# Patient Record
Sex: MALE | Race: WHITE | URBAN - METROPOLITAN AREA
[De-identification: names, ages, dates, MRNs, and addresses within clinical notes are randomized per-mention and may not be internally consistent; named-entity substitution may affect disease eponyms.]

---
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
## 2025-03-06 ENCOUNTER — HOSPITAL ENCOUNTER (EMERGENCY)
Facility: HOSPITAL | Age: 20
Discharge: HOME | End: 2025-03-06
Attending: STUDENT IN AN ORGANIZED HEALTH CARE EDUCATION/TRAINING PROGRAM
Payer: COMMERCIAL

## 2025-03-06 VITALS
BODY MASS INDEX: 25.71 KG/M2 | OXYGEN SATURATION: 96 % | SYSTOLIC BLOOD PRESSURE: 139 MMHG | DIASTOLIC BLOOD PRESSURE: 80 MMHG | HEART RATE: 74 BPM | WEIGHT: 160 LBS | TEMPERATURE: 98 F | HEIGHT: 66 IN | RESPIRATION RATE: 20 BRPM

## 2025-03-06 DIAGNOSIS — K21.9 GERD (GASTROESOPHAGEAL REFLUX DISEASE): ICD-10-CM

## 2025-03-06 PROCEDURE — 99284 EMERGENCY DEPT VISIT MOD MDM: CPT | Mod: 25

## 2025-03-06 PROCEDURE — 93005 ELECTROCARDIOGRAM TRACING: CPT

## 2025-03-06 PROCEDURE — 25000003 PHARM REV CODE 250: Performed by: STUDENT IN AN ORGANIZED HEALTH CARE EDUCATION/TRAINING PROGRAM

## 2025-03-06 PROCEDURE — 93010 ELECTROCARDIOGRAM REPORT: CPT | Mod: ,,, | Performed by: INTERNAL MEDICINE

## 2025-03-06 RX ORDER — ALUMINUM HYDROXIDE, MAGNESIUM HYDROXIDE, AND SIMETHICONE 1200; 120; 1200 MG/30ML; MG/30ML; MG/30ML
30 SUSPENSION ORAL ONCE
Status: COMPLETED | OUTPATIENT
Start: 2025-03-06 | End: 2025-03-06

## 2025-03-06 RX ORDER — OMEPRAZOLE 20 MG/1
20 CAPSULE, DELAYED RELEASE ORAL DAILY
Qty: 30 CAPSULE | Refills: 1 | Status: SHIPPED | OUTPATIENT
Start: 2025-03-06 | End: 2026-03-06

## 2025-03-06 RX ORDER — FAMOTIDINE 20 MG/1
20 TABLET, FILM COATED ORAL
Status: COMPLETED | OUTPATIENT
Start: 2025-03-06 | End: 2025-03-06

## 2025-03-06 RX ORDER — LIDOCAINE HYDROCHLORIDE 20 MG/ML
15 SOLUTION OROPHARYNGEAL ONCE
Status: COMPLETED | OUTPATIENT
Start: 2025-03-06 | End: 2025-03-06

## 2025-03-06 RX ADMIN — ALUMINUM HYDROXIDE, MAGNESIUM HYDROXIDE, AND SIMETHICONE 30 ML: 1200; 120; 1200 SUSPENSION ORAL at 07:03

## 2025-03-06 RX ADMIN — LIDOCAINE HYDROCHLORIDE 15 ML: 20 SOLUTION ORAL at 07:03

## 2025-03-06 RX ADMIN — FAMOTIDINE 20 MG: 20 TABLET, FILM COATED ORAL at 07:03

## 2025-03-06 NOTE — ED PROVIDER NOTES
Encounter Date: 3/6/2025       History     Chief Complaint   Patient presents with    epigastric pain for 2 days     States has been dry heaving with this pain for 2 days     HPI  Patient is a 19-year-old male no significant past medical history who presents to the ER complaining of epigastric pain radiating to his chest ongoing for the past 2 days.  Patient notes that pain is worse at night.  He denies any shortness of breath.  Patient denies any back pain.  Patient states pain feels like a burning in sensation.  Review of patient's allergies indicates:  No Known Allergies  History reviewed. No pertinent past medical history.  History reviewed. No pertinent surgical history.  No family history on file.  Social History[1]  Review of Systems   Constitutional:  Negative for fever.   HENT:  Negative for sore throat.    Eyes:  Negative for visual disturbance.   Respiratory:  Negative for shortness of breath.    Cardiovascular:  Positive for chest pain.   Gastrointestinal:  Positive for abdominal pain. Negative for nausea.        Epigastric pain   Endocrine: Negative for polyuria.   Genitourinary:  Negative for dysuria.   Musculoskeletal:  Negative for back pain.   Skin:  Negative for rash.   Neurological:  Negative for weakness.   Hematological:  Does not bruise/bleed easily.   All other systems reviewed and are negative.      Physical Exam     Initial Vitals [03/06/25 0716]   BP Pulse Resp Temp SpO2   139/80 74 20 97.9 °F (36.6 °C) 96 %      MAP       --         Physical Exam    Nursing note and vitals reviewed.  Constitutional: Vital signs are normal. He appears well-developed and well-nourished. He is not diaphoretic. He is active.  Non-toxic appearance. He does not appear ill. No distress.   HENT:   Head: Normocephalic and atraumatic.   Eyes: Conjunctivae are normal. Pupils are equal, round, and reactive to light. Right conjunctiva is not injected. Left conjunctiva is not injected.   Neck: Trachea normal. Neck  supple.   Normal range of motion.   Full passive range of motion without pain.     Cardiovascular:  Normal rate, regular rhythm, S1 normal, S2 normal, intact distal pulses and normal pulses.           Pulmonary/Chest: Breath sounds normal. No respiratory distress. He has no wheezes.   Abdominal: Abdomen is soft. Bowel sounds are normal. There is abdominal tenderness.   Reproducible epigastric pain on palpation.  No rebound, no guarding, normoactive bowel sounds.  No palpable masses.   Musculoskeletal:         General: No tenderness or edema. Normal range of motion.      Cervical back: Full passive range of motion without pain, normal range of motion and neck supple. No rigidity.      Right lower leg: No swelling. No edema.      Left lower leg: No swelling. No edema.     Neurological: He is alert and oriented to person, place, and time.   Skin: Skin is warm and dry. Capillary refill takes less than 2 seconds.         ED Course   Procedures  Labs Reviewed - No data to display  EKG Readings: (Independently Interpreted)   Initial Reading: No STEMI. Rhythm: Sinus Bradycardia. Heart Rate: 47.   Sinus bradycardia, rate of 47 beats per minute, normal axis, no acute ST elevations noted.  Intervals within normal limits.       Imaging Results              X-Ray Chest AP Portable (Final result)  Result time 03/06/25 07:58:47      Final result by Norm Rowley MD (03/06/25 07:58:47)                   Impression:      No acute cardiopulmonary process identified.      Electronically signed by: Norm Rowley  Date:    03/06/2025  Time:    07:58               Narrative:    EXAMINATION:  XR CHEST AP PORTABLE    CLINICAL HISTORY:  Gastro-esophageal reflux disease without esophagitis    TECHNIQUE:  One view    COMPARISON:  None available.    FINDINGS:  Cardiopericardial silhouette is within normal limits. Lungs are without dense focal or segmental consolidation, congestive process, pleural effusions or pneumothorax.                                        Medications   aluminum-magnesium hydroxide-simethicone 200-200-20 mg/5 mL suspension 30 mL (30 mLs Oral Given 3/6/25 0737)     And   LIDOcaine viscous HCl 2% oral solution 15 mL (15 mLs Oral Given 3/6/25 0737)   famotidine tablet 20 mg (20 mg Oral Given 3/6/25 0737)     Medical Decision Making  Patient is a 19-year-old male no significant past medical history who presents to the ER complaining of epigastric pain radiating to his chest ongoing for the past 2 days.     Differential diagnosis includes but not limited to: GERD, ACS, arrhythmia, pneumonia, pneumothorax, [place]    Patient vitals on arrival stable.  Physical exam noted normal cardiopulmonary tones on auscultation.  Reproducible epigastric pain on palpation.  Abdomen is soft, no rebound or guarding.  Screening EKG obtained negative for any gross abnormalities.  Chest x-ray negative for any acute cardiopulmonary disease process.  Patient given GI cocktail, famotidine here in the ER, reassess.  Feeling much better.  Will treat for GERD, discharge on omeprazole.  Encouraged to follow up with PCP in next 2-3 days.  Strict return precautions given.  Patient was counseled on dietary changes, lifestyle changes.  He endorses understanding.  Going home with the mother who was at the bedside.  Both in agreement with plan as noted.    Hardy Navarro M.D.  Emergency Medicine        Amount and/or Complexity of Data Reviewed  Radiology: ordered.    Risk  OTC drugs.  Prescription drug management.                                      Clinical Impression:  Final diagnoses:  [K21.9] GERD (gastroesophageal reflux disease)          ED Disposition Condition    Discharge Stable          ED Prescriptions       Medication Sig Dispense Start Date End Date Auth. Provider    omeprazole (PRILOSEC) 20 MG capsule Take 1 capsule (20 mg total) by mouth once daily. 30 capsule 3/6/2025 3/6/2026 Hardy Navarro MD          Follow-up Information       Follow up With  Specialties Details Why Contact Info    Your primary care provider  Schedule an appointment as soon as possible for a visit in 2 days For follow up     [facility] - Emergency Dept Emergency Medicine  If symptoms worsen [address] [facility] [id number] [phone]               [1]   Social History  Tobacco Use    Smoking status: Never    Smokeless tobacco: Never   Substance Use Topics    Alcohol use: Never    Drug use: Never        Hardy Navarro MD  03/06/25 0806

## 2025-03-06 NOTE — Clinical Note
"Nick Oliveros (Nicholas)y was seen and treated in our emergency department on 3/6/2025.  He may return to work on 03/08/2025.       If you have any questions or concerns, please don't hesitate to call.      SAMM SHIN    "

## 2025-03-10 LAB
OHS QRS DURATION: 74 MS
OHS QTC CALCULATION: 373 MS